# Patient Record
Sex: MALE | Race: WHITE | Employment: OTHER | URBAN - METROPOLITAN AREA
[De-identification: names, ages, dates, MRNs, and addresses within clinical notes are randomized per-mention and may not be internally consistent; named-entity substitution may affect disease eponyms.]

---

## 2017-03-04 ENCOUNTER — HOSPITAL ENCOUNTER (OUTPATIENT)
Age: 67
Discharge: HOME | End: 2017-03-04
Attending: FAMILY MEDICINE
Payer: COMMERCIAL

## 2017-03-04 VITALS
TEMPERATURE: 98 F | HEART RATE: 90 BPM | RESPIRATION RATE: 22 BRPM | DIASTOLIC BLOOD PRESSURE: 87 MMHG | OXYGEN SATURATION: 98 % | SYSTOLIC BLOOD PRESSURE: 144 MMHG

## 2017-03-04 DIAGNOSIS — J20.9 ACUTE BRONCHITIS, UNSPECIFIED ORGANISM: Primary | ICD-10-CM

## 2017-03-04 DIAGNOSIS — R06.2 WHEEZING: ICD-10-CM

## 2017-03-04 PROCEDURE — 94640 AIRWAY INHALATION TREATMENT: CPT

## 2017-03-04 PROCEDURE — 99214 OFFICE O/P EST MOD 30 MIN: CPT

## 2017-03-04 RX ORDER — CEFDINIR 300 MG/1
300 CAPSULE ORAL 2 TIMES DAILY
Qty: 20 CAPSULE | Refills: 0 | Status: SHIPPED | OUTPATIENT
Start: 2017-03-04 | End: 2017-03-14

## 2017-03-04 RX ORDER — IPRATROPIUM BROMIDE AND ALBUTEROL SULFATE 2.5; .5 MG/3ML; MG/3ML
3 SOLUTION RESPIRATORY (INHALATION) ONCE
Status: COMPLETED | OUTPATIENT
Start: 2017-03-04 | End: 2017-03-04

## 2017-03-04 RX ORDER — ALBUTEROL SULFATE 90 UG/1
2 AEROSOL, METERED RESPIRATORY (INHALATION) EVERY 4 HOURS PRN
Qty: 1 INHALER | Refills: 0 | Status: SHIPPED | OUTPATIENT
Start: 2017-03-04 | End: 2017-04-03

## 2017-03-04 NOTE — ED PROVIDER NOTES
Patient presents with:  Cough/URI      HPI:     Diane Davenport is a 74year old male who presents for evaluation of a chief complaint of fever, sore throat, cough which is  dry, chest tightness or congestion and wheezing. Location: Respiratory System.  Onse Soln   Sig: Inhale 2 puffs into the lungs every 4 (four) hours as needed for Wheezing. Dispense:  1 Inhaler   Refill:  0  cefdinir 300 MG Oral Cap   Sig: Take 1 capsule (300 mg total) by mouth 2 (two) times daily.    Dispense:  20 capsule   Refill:  0

## 2017-03-04 NOTE — ED INITIAL ASSESSMENT (HPI)
Pt presents to the immediate care due to flu like symptoms. Pt states he has had chills, decreased sleep, fever, sweats, sore throat, cough and congestion x 3 days. Reports 3 people in his family has had the flu in the last few weeks.

## 2019-11-04 ENCOUNTER — HOSPITAL ENCOUNTER (OUTPATIENT)
Age: 69
Discharge: HOME | End: 2019-11-04
Payer: OTHER GOVERNMENT

## 2019-11-04 VITALS
BODY MASS INDEX: 37.49 KG/M2 | WEIGHT: 225 LBS | RESPIRATION RATE: 20 BRPM | HEART RATE: 63 BPM | OXYGEN SATURATION: 97 % | DIASTOLIC BLOOD PRESSURE: 72 MMHG | HEIGHT: 65 IN | TEMPERATURE: 98 F | SYSTOLIC BLOOD PRESSURE: 137 MMHG
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
## 2019-11-04 DIAGNOSIS — S40.862A TICK BITE OF LEFT UPPER ARM, INITIAL ENCOUNTER: Primary | ICD-10-CM

## 2019-11-04 PROCEDURE — 81229 CYTOG ALYS CHRML ABNR SNPCGH: CPT

## 2019-11-04 PROCEDURE — 99213 OFFICE O/P EST LOW 20 MIN: CPT

## 2019-11-04 PROCEDURE — 99214 OFFICE O/P EST MOD 30 MIN: CPT

## 2019-11-04 PROCEDURE — 88300 SURGICAL PATH GROSS: CPT | Performed by: PHYSICIAN ASSISTANT

## 2019-11-04 RX ORDER — DOXYCYCLINE HYCLATE 100 MG/1
100 CAPSULE ORAL 2 TIMES DAILY
Qty: 14 CAPSULE | Refills: 0 | Status: SHIPPED | OUTPATIENT
Start: 2019-11-04 | End: 2019-11-18

## 2019-11-04 NOTE — ED PROVIDER NOTES
Patient Seen in: [facility]      History   Patient presents with:  Rash Skin Problem (integumentary)    Stated Complaint: tick bite    HPI    Susu Lugo is a 69-year-old male who comes in today complaining of a tick bite to the left Constitutional:       General: He is not in acute distress. Appearance: He is well-developed. He is not diaphoretic. HENT:      Head: Normocephalic and atraumatic. Neck:      Musculoskeletal: Normal range of motion.    Cardiovascular:      Rate an initial encounter  (primary encounter diagnosis)    Disposition:  Discharge  11/4/2019  8:22 am    Follow-up:  Debora Box MD   [address] [facility] [address]    Schedule an appointment as soon as possible for a visit       Pin

## 2019-11-04 NOTE — ED INITIAL ASSESSMENT (HPI)
Pt c/o tick bite to left upper arm. States wife, who is a nurse practitioner, removed tick this am.  Pt c/o hot/cold flashes, fatigue and joint pain. 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 Thinks tick may have been there x 1 week.
(3) adequate

## 2019-11-05 NOTE — ED NOTES
Called patient and told tick was a vector for lyme and to finish doxy and we will call with result from [facility]. Patient verbalized understanding.

## 2019-12-29 ENCOUNTER — APPOINTMENT (OUTPATIENT)
Dept: GENERAL RADIOLOGY | Age: 69
End: 2019-12-29
Attending: EMERGENCY MEDICINE
Payer: COMMERCIAL

## 2019-12-29 ENCOUNTER — HOSPITAL ENCOUNTER (OUTPATIENT)
Age: 69
Discharge: HOME | End: 2019-12-29
Attending: EMERGENCY MEDICINE
Payer: COMMERCIAL

## 2019-12-29 VITALS
DIASTOLIC BLOOD PRESSURE: 63 MMHG | BODY MASS INDEX: 38.41 KG/M2 | TEMPERATURE: 98 F | HEIGHT: 64 IN | OXYGEN SATURATION: 96 % | WEIGHT: 225 LBS | SYSTOLIC BLOOD PRESSURE: 123 MMHG | RESPIRATION RATE: 20 BRPM | HEART RATE: 78 BPM

## 2019-12-29 DIAGNOSIS — J20.9 BRONCHOSPASM WITH BRONCHITIS, ACUTE: Primary | ICD-10-CM

## 2019-12-29 LAB
ATRIAL RATE: 61 BPM
P-R INTERVAL: 214 MS
Q-T INTERVAL: 406 MS
QRS DURATION: 124 MS
QTC CALCULATION (BEZET): 408 MS
R AXIS: -57 DEGREES
T AXIS: 1 DEGREES
VENTRICULAR RATE: 61 BPM

## 2019-12-29 PROCEDURE — 93010 ELECTROCARDIOGRAM REPORT: CPT

## 2019-12-29 PROCEDURE — 99214 OFFICE O/P EST MOD 30 MIN: CPT

## 2019-12-29 PROCEDURE — 71046 X-RAY EXAM CHEST 2 VIEWS: CPT | Performed by: EMERGENCY MEDICINE

## 2019-12-29 PROCEDURE — 93005 ELECTROCARDIOGRAM TRACING: CPT

## 2019-12-29 RX ORDER — ALBUTEROL SULFATE 90 UG/1
1-2 AEROSOL, METERED RESPIRATORY (INHALATION) EVERY 4 HOURS PRN
Qty: 1 INHALER | Refills: 0 | Status: SHIPPED | OUTPATIENT
Start: 2019-12-29 | End: 2019-12-30

## 2019-12-29 RX ORDER — ALBUTEROL SULFATE 2.5 MG/3ML
2.5 SOLUTION RESPIRATORY (INHALATION) EVERY 4 HOURS PRN
Qty: 30 AMPULE | Refills: 1 | Status: SHIPPED | OUTPATIENT
Start: 2019-12-29

## 2019-12-29 RX ORDER — PREDNISONE 10 MG/1
TABLET ORAL
Qty: 30 TABLET | Refills: 0 | Status: SHIPPED | OUTPATIENT
Start: 2019-12-29

## 2019-12-29 NOTE — ED PROVIDER NOTES
Patient presents with:  Cough/URI      HPI:     Dima Elmore is a 68year old male who presents for cough and xcongestion for 10 days. + multiple sick contacts.    + felt fevrish, no documented temp  Tried OTC  Theraflu with minimal relief  + wheezing  + radiographs were obtained. PATIENT STATED HISTORY: (As transcribed by Technologist)  Cough, congestion [id number]. Fever. Non-smoker. FINDINGS:  LUNGS:  No focal consolidation. Normal vascularity. CARDIAC:  Normal size cardiac silhouette.  MEDIASTINUM:  N x-ray reviewed with patient no evidence for any pneumonia. Suspect underlying COPD possible COPD exacerbation. Will treat with steroids and albuterol nebulizations or inhalers  3. Start prednisone tapering dose  4.  Nebulize with albuterol every 4-6 hours

## 2019-12-29 NOTE — ED INITIAL ASSESSMENT (HPI)
C/O cough and congestion x10 days. Sts that he is not feeling any better. Report exertional dyspnea.

## 2019-12-30 RX ORDER — ALBUTEROL SULFATE 90 UG/1
AEROSOL, METERED RESPIRATORY (INHALATION)
Qty: 42.5 G | Refills: 0 | Status: SHIPPED | OUTPATIENT
Start: 2019-12-30

## 2021-03-12 DIAGNOSIS — Z23 NEED FOR VACCINATION: ICD-10-CM

## 2023-01-12 ENCOUNTER — TELEPHONE (OUTPATIENT)
Dept: CT IMAGING | Facility: HOSPITAL | Age: 73
End: 2023-01-12

## 2023-01-16 ENCOUNTER — TELEPHONE (OUTPATIENT)
Dept: CT IMAGING | Facility: HOSPITAL | Age: 73
End: 2023-01-16

## 2023-01-16 NOTE — TELEPHONE ENCOUNTER
Patient stated he wasn't able to have appointment for MRI with us as his cardiologist is from [facility]. He stated to cancel his apt. CS notified.

## 2023-05-22 ENCOUNTER — TELEPHONE (OUTPATIENT)
Dept: CT IMAGING | Facility: HOSPITAL | Age: 73
End: 2023-05-22

## 2023-05-22 NOTE — TELEPHONE ENCOUNTER
I reached out to patient to alert him that we had no signed orders from Cardiology at [facility]. He stated he is seen strictly through [facility]. He stated he spoke with someone over a week ago confirming all necessary information was given that was needed and that his scan was set. I explained that we do a final check and make sure we have signed orders from attending cardiologist confirming safety of device in MRI. Pt wife Eunice Roman got on call and stated pt is cared for through the [address] [place] [address] and the cardiologists don't provide signed orders for outside hospitals that don't manage MRI device patients with on-site device clinics. She stated he would look to other hospitals such as [facility] as she stated they will take this type of patient. I apologized for the confusion and told patient I would cancel his MRI apt for 5/25.

## 2023-05-23 ENCOUNTER — TELEPHONE (OUTPATIENT)
Dept: CT IMAGING | Facility: HOSPITAL | Age: 73
End: 2023-05-23

## 2023-05-23 NOTE — TELEPHONE ENCOUNTER
Received a call back following the message I left concerning pt and his MRI that has been cancelled due to lack of signed cardiology/device orders. I spoke with Chavez Cerrato RN. Explained our process in obtaining signed ordered and that currently [facility] does not provide these orders. He stated he would put message into ordering provider to follow up on pt needs.

## 2023-05-23 NOTE — TELEPHONE ENCOUNTER
[facility] to provide message to Dr Megan Foley alerting her that this patient is unable to have MRI at [facility] due to the fact that we cannot obtain signed clearance device orders from his cardiologist at [facility]. Callback number provided for any questions. I spoke with Jael contact at [facility].

## 2023-09-12 ENCOUNTER — HOSPITAL ENCOUNTER (INPATIENT)
Facility: HOSPITAL | Age: 73
LOS: 2 days | Discharge: HOME | DRG: 177 | End: 2023-09-14
Attending: EMERGENCY MEDICINE | Admitting: STUDENT IN AN ORGANIZED HEALTH CARE EDUCATION/TRAINING PROGRAM
Payer: COMMERCIAL

## 2023-09-12 ENCOUNTER — HOSPITAL ENCOUNTER (OUTPATIENT)
Age: 73
Discharge: ANOTHER HOSPITAL | End: 2023-09-12
Attending: EMERGENCY MEDICINE
Payer: COMMERCIAL

## 2023-09-12 ENCOUNTER — APPOINTMENT (OUTPATIENT)
Dept: GENERAL RADIOLOGY | Age: 73
End: 2023-09-12
Attending: EMERGENCY MEDICINE
Payer: COMMERCIAL

## 2023-09-12 ENCOUNTER — APPOINTMENT (OUTPATIENT)
Dept: GENERAL RADIOLOGY | Age: 73
DRG: 177 | End: 2023-09-12
Attending: EMERGENCY MEDICINE
Payer: COMMERCIAL

## 2023-09-12 ENCOUNTER — APPOINTMENT (OUTPATIENT)
Dept: CT IMAGING | Facility: HOSPITAL | Age: 73
DRG: 177 | End: 2023-09-12
Attending: EMERGENCY MEDICINE
Payer: COMMERCIAL

## 2023-09-12 ENCOUNTER — APPOINTMENT (OUTPATIENT)
Dept: CT IMAGING | Facility: HOSPITAL | Age: 73
End: 2023-09-12
Attending: EMERGENCY MEDICINE
Payer: COMMERCIAL

## 2023-09-12 ENCOUNTER — HOSPITAL ENCOUNTER (OUTPATIENT)
Age: 73
Discharge: ANOTHER HOSPITAL | DRG: 177 | End: 2023-09-12
Attending: EMERGENCY MEDICINE
Payer: COMMERCIAL

## 2023-09-12 ENCOUNTER — HOSPITAL ENCOUNTER (INPATIENT)
Facility: HOSPITAL | Age: 73
LOS: 2 days | Discharge: HOME | End: 2023-09-14
Attending: EMERGENCY MEDICINE | Admitting: STUDENT IN AN ORGANIZED HEALTH CARE EDUCATION/TRAINING PROGRAM
Payer: COMMERCIAL

## 2023-09-12 VITALS
HEART RATE: 112 BPM | OXYGEN SATURATION: 96 % | RESPIRATION RATE: 22 BRPM | SYSTOLIC BLOOD PRESSURE: 156 MMHG | TEMPERATURE: 98 F | DIASTOLIC BLOOD PRESSURE: 99 MMHG

## 2023-09-12 DIAGNOSIS — J12.82 PNEUMONIA DUE TO 2019 NOVEL CORONAVIRUS: ICD-10-CM

## 2023-09-12 DIAGNOSIS — U07.1 COVID-19: Primary | ICD-10-CM

## 2023-09-12 DIAGNOSIS — J44.1 COPD EXACERBATION (HCC): ICD-10-CM

## 2023-09-12 DIAGNOSIS — U07.1 PNEUMONIA DUE TO 2019 NOVEL CORONAVIRUS: ICD-10-CM

## 2023-09-12 DIAGNOSIS — J18.9 COMMUNITY ACQUIRED PNEUMONIA, UNSPECIFIED LATERALITY: ICD-10-CM

## 2023-09-12 LAB
#MXD IC: 0.7 X10ˆ3/UL (ref 0.1–1)
ALBUMIN SERPL-MCNC: 3.2 G/DL (ref 3.4–5)
ALBUMIN/GLOB SERPL: 0.8 {RATIO} (ref 1–2)
ALP LIVER SERPL-CCNC: 99 U/L
ALT SERPL-CCNC: 67 U/L
ANION GAP SERPL CALC-SCNC: 6 MMOL/L (ref 0–18)
AST SERPL-CCNC: 28 U/L (ref 15–37)
ATRIAL RATE: 110 BPM
BASOPHILS # BLD AUTO: 0.04 X10(3) UL (ref 0–0.2)
BASOPHILS NFR BLD AUTO: 0.4 %
BILIRUB SERPL-MCNC: 0.2 MG/DL (ref 0.1–2)
BUN BLD-MCNC: 18 MG/DL (ref 7–18)
BUN BLD-MCNC: 18 MG/DL (ref 7–18)
CALCIUM BLD-MCNC: 9.2 MG/DL (ref 8.5–10.1)
CHLORIDE BLD-SCNC: 106 MMOL/L (ref 98–112)
CHLORIDE SERPL-SCNC: 107 MMOL/L (ref 98–112)
CO2 BLD-SCNC: 24 MMOL/L (ref 21–32)
CO2 SERPL-SCNC: 25 MMOL/L (ref 21–32)
CREAT BLD-MCNC: 1.3 MG/DL
CREAT BLD-MCNC: 1.4 MG/DL
DDIMER WHOLE BLOOD: 2060 NG/ML DDU (ref ?–400)
EGFRCR SERPLBLD CKD-EPI 2021: 53 ML/MIN/1.73M2 (ref 60–?)
EGFRCR SERPLBLD CKD-EPI 2021: 58 ML/MIN/1.73M2 (ref 60–?)
EOSINOPHIL # BLD AUTO: 0.04 X10(3) UL (ref 0–0.7)
EOSINOPHIL NFR BLD AUTO: 0.4 %
ERYTHROCYTE [DISTWIDTH] IN BLOOD BY AUTOMATED COUNT: 12.7 %
GLOBULIN PLAS-MCNC: 4.2 G/DL (ref 2.8–4.4)
GLUCOSE BLD-MCNC: 134 MG/DL (ref 70–99)
GLUCOSE BLD-MCNC: 151 MG/DL (ref 70–99)
HCT VFR BLD AUTO: 40.8 %
HCT VFR BLD AUTO: 42.6 %
HCT VFR BLD CALC: 41 %
HGB BLD-MCNC: 13.5 G/DL
HGB BLD-MCNC: 13.7 G/DL
IMM GRANULOCYTES # BLD AUTO: 0.08 X10(3) UL (ref 0–1)
IMM GRANULOCYTES NFR BLD: 0.9 %
ISTAT IONIZED CALCIUM FOR CHEM 8: 1.13 MMOL/L (ref 1.12–1.32)
LYMPHOCYTES # BLD AUTO: 0.35 X10(3) UL (ref 1–4)
LYMPHOCYTES # BLD AUTO: 0.9 X10ˆ3/UL (ref 1–4)
LYMPHOCYTES NFR BLD AUTO: 3.8 %
LYMPHOCYTES NFR BLD AUTO: 9.7 %
MCH RBC QN AUTO: 28.5 PG (ref 26–34)
MCH RBC QN AUTO: 28.7 PG (ref 26–34)
MCHC RBC AUTO-ENTMCNC: 32.2 G/DL (ref 31–37)
MCHC RBC AUTO-ENTMCNC: 33.1 G/DL (ref 31–37)
MCV RBC AUTO: 86.6 FL
MCV RBC AUTO: 88.8 FL (ref 80–100)
MIXED CELL %: 7.8 %
MONOCYTES # BLD AUTO: 0.1 X10(3) UL (ref 0.1–1)
MONOCYTES NFR BLD AUTO: 1.1 %
NEUTROPHILS # BLD AUTO: 7.3 X10ˆ3/UL (ref 1.5–7.7)
NEUTROPHILS # BLD AUTO: 8.66 X10 (3) UL (ref 1.5–7.7)
NEUTROPHILS # BLD AUTO: 8.66 X10(3) UL (ref 1.5–7.7)
NEUTROPHILS NFR BLD AUTO: 82.5 %
NEUTROPHILS NFR BLD AUTO: 93.4 %
NT-PROBNP SERPL-MCNC: 242 PG/ML (ref ?–125)
OSMOLALITY SERPL CALC.SUM OF ELEC: 291 MOSM/KG (ref 275–295)
P-R INTERVAL: 158 MS
PLATELET # BLD AUTO: 516 10(3)UL (ref 150–450)
PLATELET # BLD AUTO: 676 X10ˆ3/UL (ref 150–450)
POTASSIUM BLD-SCNC: 3.8 MMOL/L (ref 3.6–5.1)
POTASSIUM SERPL-SCNC: 4.3 MMOL/L (ref 3.5–5.1)
PROT SERPL-MCNC: 7.4 G/DL (ref 6.4–8.2)
Q-T INTERVAL: 402 MS
QRS DURATION: 156 MS
QTC CALCULATION (BEZET): 544 MS
R AXIS: -65 DEGREES
RBC # BLD AUTO: 4.71 X10(6)UL
RBC # BLD AUTO: 4.8 X10ˆ6/UL
SARS-COV-2 RNA RESP QL NAA+PROBE: DETECTED
SODIUM BLD-SCNC: 139 MMOL/L (ref 136–145)
SODIUM SERPL-SCNC: 138 MMOL/L (ref 136–145)
T AXIS: 96 DEGREES
TROPONIN I BLD-MCNC: <0.02 NG/ML
VENTRICULAR RATE: 110 BPM
WBC # BLD AUTO: 8.9 X10ˆ3/UL (ref 4–11)
WBC # BLD AUTO: 9.3 X10(3) UL (ref 4–11)

## 2023-09-12 PROCEDURE — 85025 COMPLETE CBC W/AUTO DIFF WBC: CPT | Performed by: EMERGENCY MEDICINE

## 2023-09-12 PROCEDURE — 80047 BASIC METABLC PNL IONIZED CA: CPT

## 2023-09-12 PROCEDURE — 93010 ELECTROCARDIOGRAM REPORT: CPT

## 2023-09-12 PROCEDURE — 85378 FIBRIN DEGRADE SEMIQUANT: CPT | Performed by: EMERGENCY MEDICINE

## 2023-09-12 PROCEDURE — 93005 ELECTROCARDIOGRAM TRACING: CPT

## 2023-09-12 PROCEDURE — 99214 OFFICE O/P EST MOD 30 MIN: CPT

## 2023-09-12 PROCEDURE — 99223 1ST HOSP IP/OBS HIGH 75: CPT | Performed by: STUDENT IN AN ORGANIZED HEALTH CARE EDUCATION/TRAINING PROGRAM

## 2023-09-12 PROCEDURE — 71046 X-RAY EXAM CHEST 2 VIEWS: CPT | Performed by: EMERGENCY MEDICINE

## 2023-09-12 PROCEDURE — 94640 AIRWAY INHALATION TREATMENT: CPT

## 2023-09-12 PROCEDURE — 71275 CT ANGIOGRAPHY CHEST: CPT | Performed by: EMERGENCY MEDICINE

## 2023-09-12 PROCEDURE — 84484 ASSAY OF TROPONIN QUANT: CPT

## 2023-09-12 RX ORDER — GABAPENTIN 300 MG/1
300 CAPSULE ORAL NIGHTLY
COMMUNITY

## 2023-09-12 RX ORDER — ACETAMINOPHEN 500 MG
500 TABLET ORAL EVERY 4 HOURS PRN
Status: DISCONTINUED | OUTPATIENT
Start: 2023-09-12 | End: 2023-09-14

## 2023-09-12 RX ORDER — AZITHROMYCIN 250 MG/1
500 TABLET, FILM COATED ORAL
Status: DISCONTINUED | OUTPATIENT
Start: 2023-09-13 | End: 2023-09-14

## 2023-09-12 RX ORDER — CLOPIDOGREL BISULFATE 75 MG/1
75 TABLET ORAL DAILY
COMMUNITY

## 2023-09-12 RX ORDER — ENEMA 19; 7 G/133ML; G/133ML
1 ENEMA RECTAL ONCE AS NEEDED
Status: DISCONTINUED | OUTPATIENT
Start: 2023-09-12 | End: 2023-09-14

## 2023-09-12 RX ORDER — ENOXAPARIN SODIUM 100 MG/ML
40 INJECTION SUBCUTANEOUS DAILY
Status: DISCONTINUED | OUTPATIENT
Start: 2023-09-13 | End: 2023-09-14

## 2023-09-12 RX ORDER — IPRATROPIUM BROMIDE AND ALBUTEROL SULFATE 2.5; .5 MG/3ML; MG/3ML
3 SOLUTION RESPIRATORY (INHALATION) ONCE
Status: DISCONTINUED | OUTPATIENT
Start: 2023-09-12 | End: 2023-09-12

## 2023-09-12 RX ORDER — BENZONATATE 200 MG/1
200 CAPSULE ORAL 3 TIMES DAILY PRN
Status: DISCONTINUED | OUTPATIENT
Start: 2023-09-12 | End: 2023-09-14

## 2023-09-12 RX ORDER — FLUTICASONE PROPIONATE 50 MCG
1 SPRAY, SUSPENSION (ML) NASAL 2 TIMES DAILY
Status: DISCONTINUED | OUTPATIENT
Start: 2023-09-12 | End: 2023-09-14

## 2023-09-12 RX ORDER — ALBUTEROL SULFATE 2.5 MG/3ML
2.5 SOLUTION RESPIRATORY (INHALATION) EVERY 4 HOURS PRN
Status: DISCONTINUED | OUTPATIENT
Start: 2023-09-12 | End: 2023-09-14

## 2023-09-12 RX ORDER — LISINOPRIL AND HYDROCHLOROTHIAZIDE 25; 20 MG/1; MG/1
1 TABLET ORAL DAILY
Status: DISCONTINUED | OUTPATIENT
Start: 2023-09-12 | End: 2023-09-12 | Stop reason: SDUPTHER

## 2023-09-12 RX ORDER — GUAIFENESIN 600 MG/1
600 TABLET, EXTENDED RELEASE ORAL 2 TIMES DAILY
Status: DISCONTINUED | OUTPATIENT
Start: 2023-09-12 | End: 2023-09-14

## 2023-09-12 RX ORDER — ALBUTEROL SULFATE 90 UG/1
4 AEROSOL, METERED RESPIRATORY (INHALATION) EVERY 4 HOURS PRN
Status: DISCONTINUED | OUTPATIENT
Start: 2023-09-12 | End: 2023-09-14

## 2023-09-12 RX ORDER — POLYETHYLENE GLYCOL 3350 17 G/17G
17 POWDER, FOR SOLUTION ORAL DAILY PRN
Status: DISCONTINUED | OUTPATIENT
Start: 2023-09-12 | End: 2023-09-14

## 2023-09-12 RX ORDER — BUPROPION HYDROCHLORIDE 150 MG/1
150 TABLET, EXTENDED RELEASE ORAL 2 TIMES DAILY
Status: DISCONTINUED | OUTPATIENT
Start: 2023-09-12 | End: 2023-09-14

## 2023-09-12 RX ORDER — ASPIRIN 81 MG/1
1 TABLET ORAL 2 TIMES DAILY
COMMUNITY

## 2023-09-12 RX ORDER — ATORVASTATIN CALCIUM 80 MG/1
80 TABLET, FILM COATED ORAL
COMMUNITY

## 2023-09-12 RX ORDER — SENNOSIDES 8.6 MG
17.2 TABLET ORAL NIGHTLY PRN
Status: DISCONTINUED | OUTPATIENT
Start: 2023-09-12 | End: 2023-09-14

## 2023-09-12 RX ORDER — ALBUTEROL SULFATE 90 UG/1
8 AEROSOL, METERED RESPIRATORY (INHALATION) ONCE
Status: COMPLETED | OUTPATIENT
Start: 2023-09-12 | End: 2023-09-12

## 2023-09-12 RX ORDER — MELATONIN
3 NIGHTLY PRN
Status: DISCONTINUED | OUTPATIENT
Start: 2023-09-12 | End: 2023-09-14

## 2023-09-12 RX ORDER — HYDROCODONE BITARTRATE AND ACETAMINOPHEN 5; 325 MG/1; MG/1
1 TABLET ORAL EVERY 6 HOURS PRN
COMMUNITY

## 2023-09-12 RX ORDER — AZITHROMYCIN 250 MG/1
500 TABLET, FILM COATED ORAL ONCE
Status: COMPLETED | OUTPATIENT
Start: 2023-09-12 | End: 2023-09-12

## 2023-09-12 RX ORDER — ECHINACEA PURPUREA EXTRACT 125 MG
1 TABLET ORAL
Status: DISCONTINUED | OUTPATIENT
Start: 2023-09-12 | End: 2023-09-14

## 2023-09-12 RX ORDER — GABAPENTIN 300 MG/1
300 CAPSULE ORAL NIGHTLY
Status: DISCONTINUED | OUTPATIENT
Start: 2023-09-12 | End: 2023-09-14

## 2023-09-12 RX ORDER — BISACODYL 10 MG
10 SUPPOSITORY, RECTAL RECTAL
Status: DISCONTINUED | OUTPATIENT
Start: 2023-09-12 | End: 2023-09-14

## 2023-09-12 RX ORDER — HYDROCODONE BITARTRATE AND ACETAMINOPHEN 7.5; 3 MG/1; MG/1
7.5 TABLET ORAL EVERY 4 HOURS PRN
Status: ON HOLD | COMMUNITY
End: 2023-09-12

## 2023-09-12 RX ORDER — HYDROCODONE BITARTRATE AND ACETAMINOPHEN 10; 325 MG/1; MG/1
1 TABLET ORAL EVERY 6 HOURS PRN
Status: DISCONTINUED | OUTPATIENT
Start: 2023-09-12 | End: 2023-09-14

## 2023-09-12 RX ORDER — ASPIRIN 81 MG/1
81 TABLET ORAL 2 TIMES DAILY
Status: DISCONTINUED | OUTPATIENT
Start: 2023-09-12 | End: 2023-09-14

## 2023-09-12 RX ORDER — CLOPIDOGREL BISULFATE 75 MG/1
75 TABLET ORAL DAILY
Status: DISCONTINUED | OUTPATIENT
Start: 2023-09-13 | End: 2023-09-14

## 2023-09-12 RX ORDER — METHYLPREDNISOLONE SODIUM SUCCINATE 125 MG/2ML
125 INJECTION, POWDER, LYOPHILIZED, FOR SOLUTION INTRAMUSCULAR; INTRAVENOUS ONCE
Status: DISCONTINUED | OUTPATIENT
Start: 2023-09-12 | End: 2023-09-12

## 2023-09-12 RX ORDER — NIRMATRELVIR AND RITONAVIR 150-100 MG
2 KIT ORAL 2 TIMES DAILY
Status: ON HOLD | COMMUNITY
Start: 2023-08-28 | End: 2023-09-13

## 2023-09-12 RX ORDER — ATORVASTATIN CALCIUM 80 MG/1
80 TABLET, FILM COATED ORAL NIGHTLY
Status: DISCONTINUED | OUTPATIENT
Start: 2023-09-12 | End: 2023-09-14

## 2023-09-12 RX ORDER — METOCLOPRAMIDE HYDROCHLORIDE 5 MG/ML
5 INJECTION INTRAMUSCULAR; INTRAVENOUS EVERY 8 HOURS PRN
Status: DISCONTINUED | OUTPATIENT
Start: 2023-09-12 | End: 2023-09-14

## 2023-09-12 RX ORDER — METHYLPREDNISOLONE SODIUM SUCCINATE 125 MG/2ML
125 INJECTION, POWDER, LYOPHILIZED, FOR SOLUTION INTRAMUSCULAR; INTRAVENOUS ONCE
Status: COMPLETED | OUTPATIENT
Start: 2023-09-12 | End: 2023-09-12

## 2023-09-12 RX ORDER — ONDANSETRON 2 MG/ML
4 INJECTION INTRAMUSCULAR; INTRAVENOUS EVERY 6 HOURS PRN
Status: DISCONTINUED | OUTPATIENT
Start: 2023-09-12 | End: 2023-09-14

## 2023-09-12 NOTE — ED INITIAL ASSESSMENT (HPI)
C/o SOB for 2 weeks. Pt reports dyspnea during exertion and dyspnea during rest, weakness. Pt reports his wife had Covid 3 weeks ago. Pt reports use of rescue inhaler. Diaphoresis noted. Pt reports nasal congestion and chest congestion.

## 2023-09-12 NOTE — ED QUICK NOTES
Pt declines EMS. Pt understands to go directly to er. Pt understand to remain NPO. Pt instructed if symptoms gets worse pull over and call 911. Pt to er via private car with son. AMA form completed. Pt to car via wheelchair.

## 2023-09-12 NOTE — ED INITIAL ASSESSMENT (HPI)
Patient sent to ER from [facility] to rule out a PE. Had blood work, CXR and EKG done @ IC. Covid + , CXR showed pneumonia.

## 2023-09-13 ENCOUNTER — APPOINTMENT (OUTPATIENT)
Dept: CV DIAGNOSTICS | Facility: HOSPITAL | Age: 73
End: 2023-09-13
Attending: STUDENT IN AN ORGANIZED HEALTH CARE EDUCATION/TRAINING PROGRAM
Payer: COMMERCIAL

## 2023-09-13 ENCOUNTER — APPOINTMENT (OUTPATIENT)
Dept: ULTRASOUND IMAGING | Facility: HOSPITAL | Age: 73
End: 2023-09-13
Attending: STUDENT IN AN ORGANIZED HEALTH CARE EDUCATION/TRAINING PROGRAM
Payer: COMMERCIAL

## 2023-09-13 ENCOUNTER — APPOINTMENT (OUTPATIENT)
Dept: ULTRASOUND IMAGING | Facility: HOSPITAL | Age: 73
DRG: 177 | End: 2023-09-13
Attending: STUDENT IN AN ORGANIZED HEALTH CARE EDUCATION/TRAINING PROGRAM
Payer: COMMERCIAL

## 2023-09-13 ENCOUNTER — APPOINTMENT (OUTPATIENT)
Dept: CV DIAGNOSTICS | Facility: HOSPITAL | Age: 73
DRG: 177 | End: 2023-09-13
Attending: STUDENT IN AN ORGANIZED HEALTH CARE EDUCATION/TRAINING PROGRAM
Payer: COMMERCIAL

## 2023-09-13 LAB
ALBUMIN SERPL-MCNC: 2.9 G/DL (ref 3.4–5)
ALBUMIN/GLOB SERPL: 0.8 {RATIO} (ref 1–2)
ALP LIVER SERPL-CCNC: 85 U/L
ALT SERPL-CCNC: 56 U/L
ANION GAP SERPL CALC-SCNC: 5 MMOL/L (ref 0–18)
AST SERPL-CCNC: 16 U/L (ref 15–37)
ATRIAL RATE: 96 BPM
BASOPHILS # BLD AUTO: 0.02 X10(3) UL (ref 0–0.2)
BASOPHILS NFR BLD AUTO: 0.2 %
BILIRUB SERPL-MCNC: 0.2 MG/DL (ref 0.1–2)
BUN BLD-MCNC: 21 MG/DL (ref 7–18)
CALCIUM BLD-MCNC: 8.6 MG/DL (ref 8.5–10.1)
CHLORIDE SERPL-SCNC: 106 MMOL/L (ref 98–112)
CO2 SERPL-SCNC: 26 MMOL/L (ref 21–32)
CREAT BLD-MCNC: 1.18 MG/DL
CRP SERPL-MCNC: 1.93 MG/DL (ref ?–0.3)
DEPRECATED HBV CORE AB SER IA-ACNC: 225.4 NG/ML
EGFRCR SERPLBLD CKD-EPI 2021: 65 ML/MIN/1.73M2 (ref 60–?)
EOSINOPHIL # BLD AUTO: 0 X10(3) UL (ref 0–0.7)
EOSINOPHIL NFR BLD AUTO: 0 %
ERYTHROCYTE [DISTWIDTH] IN BLOOD BY AUTOMATED COUNT: 12.9 %
GLOBULIN PLAS-MCNC: 3.7 G/DL (ref 2.8–4.4)
GLUCOSE BLD-MCNC: 120 MG/DL (ref 70–99)
HCT VFR BLD AUTO: 38 %
HGB BLD-MCNC: 12.6 G/DL
IMM GRANULOCYTES # BLD AUTO: 0.08 X10(3) UL (ref 0–1)
IMM GRANULOCYTES NFR BLD: 0.7 %
LYMPHOCYTES # BLD AUTO: 0.64 X10(3) UL (ref 1–4)
LYMPHOCYTES NFR BLD AUTO: 6 %
MCH RBC QN AUTO: 29 PG (ref 26–34)
MCHC RBC AUTO-ENTMCNC: 33.2 G/DL (ref 31–37)
MCV RBC AUTO: 87.4 FL
MONOCYTES # BLD AUTO: 0.46 X10(3) UL (ref 0.1–1)
MONOCYTES NFR BLD AUTO: 4.3 %
NEUTROPHILS # BLD AUTO: 9.52 X10 (3) UL (ref 1.5–7.7)
NEUTROPHILS # BLD AUTO: 9.52 X10(3) UL (ref 1.5–7.7)
NEUTROPHILS NFR BLD AUTO: 88.8 %
OSMOLALITY SERPL CALC.SUM OF ELEC: 288 MOSM/KG (ref 275–295)
P-R INTERVAL: 168 MS
PLATELET # BLD AUTO: 579 10(3)UL (ref 150–450)
POTASSIUM SERPL-SCNC: 4.5 MMOL/L (ref 3.5–5.1)
PROCALCITONIN SERPL-MCNC: <0.05 NG/ML (ref ?–0.16)
PROT SERPL-MCNC: 6.6 G/DL (ref 6.4–8.2)
Q-T INTERVAL: 390 MS
QRS DURATION: 158 MS
QTC CALCULATION (BEZET): 492 MS
R AXIS: -63 DEGREES
RBC # BLD AUTO: 4.35 X10(6)UL
SODIUM SERPL-SCNC: 137 MMOL/L (ref 136–145)
T AXIS: 102 DEGREES
TROPONIN I HIGH SENSITIVITY: 34 NG/L
VENTRICULAR RATE: 96 BPM
WBC # BLD AUTO: 10.7 X10(3) UL (ref 4–11)

## 2023-09-13 PROCEDURE — 93306 TTE W/DOPPLER COMPLETE: CPT | Performed by: STUDENT IN AN ORGANIZED HEALTH CARE EDUCATION/TRAINING PROGRAM

## 2023-09-13 PROCEDURE — 99233 SBSQ HOSP IP/OBS HIGH 50: CPT | Performed by: HOSPITALIST

## 2023-09-13 PROCEDURE — 93971 EXTREMITY STUDY: CPT | Performed by: STUDENT IN AN ORGANIZED HEALTH CARE EDUCATION/TRAINING PROGRAM

## 2023-09-13 PROCEDURE — 99497 ADVNCD CARE PLAN 30 MIN: CPT | Performed by: HOSPITALIST

## 2023-09-13 NOTE — ED QUICK NOTES
Took over care of pt from [facility]. Pt resting on cart, respirations even/NL. Pt reports being sick for 12-14 days with increased WILLIAMS today, went to IC and was sent to ED, covid +. Pt room air O2 saturations 95-98%. Denies needs at this time. Call light within reach.  Provided tissues per request.

## 2023-09-13 NOTE — ED QUICK NOTES
Orders for admission, patient is aware of plan and ready to go upstairs. Any questions, please call ED RN Deneen Boas at extension [phone].      Patient Covid vaccination status: Unvaccinated     COVID Test Ordered in ED: None    COVID Suspicion at Admission: covid +    Running Infusions:  None    Mental Status/LOC at time of transport: A&Ox4    Other pertinent information:   CIWA score: N/A   NIH score:  N/A

## 2023-09-13 NOTE — PLAN OF CARE
Problem: Diabetes/Glucose Control  Goal: Glucose maintained within prescribed range  Description: INTERVENTIONS:  - Monitor Blood Glucose as ordered  - Assess for signs and symptoms of hyperglycemia and hypoglycemia  - Administer ordered medications to maintain glucose within target range  - Assess barriers to adequate nutritional intake and initiate nutrition consult as needed  - Instruct patient on self management of diabetes  Outcome: Progressing     Problem: Patient/Family Goals  Goal: Patient/Family Long Term Goal  Description: Patient's Long Term Goal: discharge    Interventions:  - follow and update POC  - See additional Care Plan goals for specific interventions  Outcome: Progressing  Goal: Patient/Family Short Term Goal  Description: Patient's Short Term Goal:   9/12 NOC: sleep    Interventions:   - cluster care  - See additional Care Plan goals for specific interventions  Outcome: Progressing

## 2023-09-13 NOTE — PROGRESS NOTES
Patient is A/Ox4. VSS. Afebrile. Patient c/o chronic back pain. See MAR. Patient is on RA. . Occasional Cough with scant sputum/ SOB with exertion. on Tele V-paced. Lovenox. Electrolyte protocol-non cardiac. Regular Diet. Denies any N/V/Diarrhea. IV ABX. Up with cane. Continent. Patient  updated on plan of care.

## 2023-09-13 NOTE — PROGRESS NOTES
NURSING ADMISSION NOTE      Patient admitted via Cart  Oriented to room. Safety precautions initiated. Bed in low position. Call light in reach. Admitted to room 519    Pt arrived alert and oriented x4, on RA. Admission navigator, med rec, and flowsheets complete. Educated pt on POC, verbalizes understanding, no further questions. MD at bedside.

## 2023-09-13 NOTE — CM/SW NOTE
Spoke with pt to address PHQ4 order. Pt has been diagnosed with PTSD/anxiety and takes medication for these conditions. He is a [employer]. He sees a psychiatrist through the [place] which is where he gets his ych meds. He says he does not need any other resources at this time.

## 2023-09-14 VITALS
OXYGEN SATURATION: 98 % | TEMPERATURE: 98 F | BODY MASS INDEX: 37.56 KG/M2 | SYSTOLIC BLOOD PRESSURE: 122 MMHG | HEIGHT: 64 IN | DIASTOLIC BLOOD PRESSURE: 71 MMHG | HEART RATE: 81 BPM | WEIGHT: 220 LBS | RESPIRATION RATE: 21 BRPM

## 2023-09-14 LAB
ALBUMIN SERPL-MCNC: 2.8 G/DL (ref 3.4–5)
ALBUMIN/GLOB SERPL: 0.8 {RATIO} (ref 1–2)
ALP LIVER SERPL-CCNC: 89 U/L
ALT SERPL-CCNC: 71 U/L
ANION GAP SERPL CALC-SCNC: 5 MMOL/L (ref 0–18)
AST SERPL-CCNC: 21 U/L (ref 15–37)
BASOPHILS # BLD AUTO: 0.08 X10(3) UL (ref 0–0.2)
BASOPHILS NFR BLD AUTO: 1 %
BILIRUB SERPL-MCNC: 0.3 MG/DL (ref 0.1–2)
BUN BLD-MCNC: 25 MG/DL (ref 7–18)
CALCIUM BLD-MCNC: 8.8 MG/DL (ref 8.5–10.1)
CHLORIDE SERPL-SCNC: 104 MMOL/L (ref 98–112)
CO2 SERPL-SCNC: 29 MMOL/L (ref 21–32)
CREAT BLD-MCNC: 1.22 MG/DL
EGFRCR SERPLBLD CKD-EPI 2021: 62 ML/MIN/1.73M2 (ref 60–?)
EOSINOPHIL # BLD AUTO: 0.37 X10(3) UL (ref 0–0.7)
EOSINOPHIL NFR BLD AUTO: 4.4 %
ERYTHROCYTE [DISTWIDTH] IN BLOOD BY AUTOMATED COUNT: 12.9 %
GLOBULIN PLAS-MCNC: 3.7 G/DL (ref 2.8–4.4)
GLUCOSE BLD-MCNC: 93 MG/DL (ref 70–99)
HCT VFR BLD AUTO: 37.9 %
HGB BLD-MCNC: 12.5 G/DL
IMM GRANULOCYTES # BLD AUTO: 0.15 X10(3) UL (ref 0–1)
IMM GRANULOCYTES NFR BLD: 1.8 %
LYMPHOCYTES # BLD AUTO: 1.08 X10(3) UL (ref 1–4)
LYMPHOCYTES NFR BLD AUTO: 12.8 %
MAGNESIUM SERPL-MCNC: 2.1 MG/DL (ref 1.6–2.6)
MCH RBC QN AUTO: 29 PG (ref 26–34)
MCHC RBC AUTO-ENTMCNC: 33 G/DL (ref 31–37)
MCV RBC AUTO: 87.9 FL
MONOCYTES # BLD AUTO: 0.68 X10(3) UL (ref 0.1–1)
MONOCYTES NFR BLD AUTO: 8.1 %
NEUTROPHILS # BLD AUTO: 6.05 X10 (3) UL (ref 1.5–7.7)
NEUTROPHILS # BLD AUTO: 6.05 X10(3) UL (ref 1.5–7.7)
NEUTROPHILS NFR BLD AUTO: 71.9 %
OSMOLALITY SERPL CALC.SUM OF ELEC: 290 MOSM/KG (ref 275–295)
PLATELET # BLD AUTO: 491 10(3)UL (ref 150–450)
POTASSIUM SERPL-SCNC: 4.4 MMOL/L (ref 3.5–5.1)
PROT SERPL-MCNC: 6.5 G/DL (ref 6.4–8.2)
RBC # BLD AUTO: 4.31 X10(6)UL
SODIUM SERPL-SCNC: 138 MMOL/L (ref 136–145)
WBC # BLD AUTO: 8.4 X10(3) UL (ref 4–11)

## 2023-09-14 PROCEDURE — 99232 SBSQ HOSP IP/OBS MODERATE 35: CPT | Performed by: HOSPITALIST

## 2023-09-14 RX ORDER — CEFUROXIME AXETIL 500 MG/1
500 TABLET ORAL 2 TIMES DAILY
Qty: 14 TABLET | Refills: 0 | Status: SHIPPED | OUTPATIENT
Start: 2023-09-14

## 2023-09-14 RX ORDER — ALBUTEROL SULFATE 2.5 MG/3ML
2.5 SOLUTION RESPIRATORY (INHALATION) EVERY 4 HOURS PRN
Qty: 30 EACH | Refills: 0 | Status: SHIPPED | OUTPATIENT
Start: 2023-09-14

## 2023-09-14 RX ORDER — LEVOFLOXACIN 750 MG/1
750 TABLET, FILM COATED ORAL EVERY OTHER DAY
Qty: 3 TABLET | Refills: 0 | Status: SHIPPED | OUTPATIENT
Start: 2023-09-14 | End: 2023-09-20

## 2023-09-14 NOTE — PROGRESS NOTES
TTE unremarkable yesterday      Card will sign off  Call w/ further questions  Cont followup post DC with usual Card at [facility]

## 2023-09-14 NOTE — PHYSICAL THERAPY NOTE
PHYSICAL THERAPY EVALUATION - INPATIENT     Room Number: 928/896-S  Evaluation Date: 2023  Type of Evaluation: Initial  Physician Order: PT Eval and Treat    Presenting Problem: +COVID19     Reason for Therapy: Mobility Dysfunction and Discharge Planning      ASSESSMENT   Pt is a 74year old male admitted on 2023 for +COVID10. Functional outcome measures completed include AMPA. The AM-PAC '6-Clicks' Inpatient Basic Mobility Short Form was completed and this patient is demonstrating a Approx Degree of Impairment: 0%  degree of impairment in mobility. Research supports that patients with this level of impairment may benefit from return to home. PT Discharge Recommendations: Home      PLAN  Patient has been evaluated and presents with no skilled Physical Therapy needs at this time. Patient discharged from Physical Therapy services. Please re-order if a new functional limitation presents during this admission. GOALS  Patient was able to achieve the following goals . .. Patient was able to transfer At previous, functional level   Patient able to ambulate on level surfaces At previous, functional level         HOME SITUATION  Type of Home: House   Home Layout: Two level                      Patient Owned Equipment: Cane       Prior Level of Young: Pt reports ambulating with cane at baseline mod ind. SUBJECTIVE  \"I couldn't even hold my head up the other day. '      OBJECTIVE     Fall Risk: Standard fall risk    WEIGHT BEARING RESTRICTION                   PAIN ASSESSMENT  Ratin          COGNITION  Overall Cognitive Status:  WFL - within functional limits    RANGE OF MOTION AND STRENGTH ASSESSMENT  Upper extremity ROM and strength are within functional limits     Lower extremity ROM is within functional limits     Lower extremity strength is within functional limits       BALANCE  Static Sitting: Good  Dynamic Sitting: Good  Static Standing: Good  Dynamic Standing: Fair -    ADDITIONAL TESTS                                    ACTIVITY TOLERANCE                         O2 WALK       NEUROLOGICAL FINDINGS                        AM-PAC '6-Clicks' INPATIENT SHORT FORM - BASIC MOBILITY  How much difficulty does the patient currently have. .. Patient Difficulty: Turning over in bed (including adjusting bedclothes, sheets and blankets)?: None   Patient Difficulty: Sitting down on and standing up from a chair with arms (e.g., wheelchair, bedside commode, etc.): None   Patient Difficulty: Moving from lying on back to sitting on the side of the bed?: None   How much help from another person does the patient currently need. .. Help from Another: Moving to and from a bed to a chair (including a wheelchair)?: None   Help from Another: Need to walk in hospital room?: None   Help from Another: Climbing 3-5 steps with a railing?: None       AM-PAC Score:  Raw Score: 24   Approx Degree of Impairment: 0%   Standardized Score (AM-PAC Scale): 61.14   CMS Modifier (G-Code): CH    FUNCTIONAL ABILITY STATUS  Gait Assessment   Functional Mobility/Gait Assessment  Gait Assistance: Modified independent  Distance (ft): 30  Assistive Device: Cane  Pattern: Within Functional Limits    Skilled Therapy Provided     Bed Mobility:  Rolling: ind  Supine to sit: ind   Sit to supine: NT     Transfer Mobility:  Sit to stand: ind   Stand to sit: ind  Gait = mod ind with cane as above. Gait restricted to within room only 2/2 COVID precautions    Therapist's comments:Encouraged regular OOB activity. Pt in agreement with. Exercise/Education Provided:  Bed mobility  Body mechanics  Functional activity tolerated  Gait training  Posture  Transfer training    Patient End of Session: Up in chair;Needs met;Call light within reach;RN aware of session/findings; All patient questions and concerns addressed    Patient Evaluation Complexity Level:  History Low - no personal factors and/or co-morbidities   Examination of body systems Low - addressing 1-2 elements   Clinical Presentation Low - Stable   Clinical Decision Making Low Complexity       PT Session Time: 8 minutes

## 2023-09-14 NOTE — PLAN OF CARE
Pt is aox4, VSS, afebrile. On RA. Tele NSR. SOB with exertion and at rest. No c/o n/v/d. C/o some back pain. Coughs with deep inspiration. Regular diet tolerating well. Up self to bathroom, pt refusing to have bed alarm on. IV saline locked, sub Q Lovenox. Pt is resting in bed with call light in place. Will continue to monitor.        Problem: Diabetes/Glucose Control  Goal: Glucose maintained within prescribed range  Description: INTERVENTIONS:  - Monitor Blood Glucose as ordered  - Assess for signs and symptoms of hyperglycemia and hypoglycemia  - Administer ordered medications to maintain glucose within target range  - Assess barriers to adequate nutritional intake and initiate nutrition consult as needed  - Instruct patient on self management of diabetes  Outcome: Progressing

## 2023-09-14 NOTE — PROGRESS NOTES
Pt refusing bed alarm educated on fall risk. Pt adamant on leaving bed alarm off. Verbalized understanding on fall risk and to use call light.

## 2023-09-14 NOTE — PROGRESS NOTES
Spoke to Agustina Booker charge RN  2/8 who stated she will try to interrogate pacemaker  if not dayshift charge RN will come to interrogate.

## 2023-09-15 ENCOUNTER — PATIENT OUTREACH (OUTPATIENT)
Dept: CASE MANAGEMENT | Age: 73
End: 2023-09-15

## 2023-09-15 DIAGNOSIS — Z02.9 ENCOUNTERS FOR UNSPECIFIED ADMINISTRATIVE PURPOSE: Primary | ICD-10-CM

## 2023-09-15 DIAGNOSIS — U07.1 COVID-19: ICD-10-CM

## 2023-09-15 PROCEDURE — 1111F DSCHRG MED/CURRENT MED MERGE: CPT

## 2023-09-18 NOTE — DISCHARGE SUMMARY
[facility] HOSPITALIST  DISCHARGE SUMMARY     Alejandrina Santo Patient Status:  Inpatient    1950 MRN [id number]   [facility] 5NW-A Attending No att. providers found   Hosp Day # 2 PCP Daphne Slaughter MD     Date of Admission: 2023  Date of Discharge:  2023     Discharge Disposition: Home or Self Care    Discharge Diagnosis:  #Innumerable areas of nodular airspace opacities with possible cavitation  #COVID 23  #Atypical chest pain  #CAD s/p PCI  #Essential hypertension  #Right lower extremity edema  #S/p pacemaker  #CKD 3  #Obesity  #Parkinson's disease       History of Present Illness: Alejandrina Santo is a 74year old male with  h/o COPD, DM HTN, HLD, CAD, pacemaker. The patient reports being diagnosed with COVID 2 weeks ago, took 4/5 days paxlovid ( lost pills for the last day )- he however continues to have headaches, has chills and gets diaphoretic. Has not been measuring his temp however. Pt reports he has been very short of breath with minimal exertion- cannot complete one flight of stairs without feeling SOB- also noted intermittent episodes of chest pain over past week which prompted him to seek medical attention. Brief Synopsis: pt admitted for sob and dx w/ pna. Pt initially had covid x2 weeks prior to presentation to the hospital, pt diagnosed w/ superimposed bacterial pna. Pt seen by pulm for abnormal CT chest findings. Pt will fu pulm as outpatient for innumerable nodules. Pt may require bronch in future. At this point pt medically stable for discharge. Pt also seen by ID who agrees w/ this plan. Lace+ Score: 43  59-90 High Risk  29-58 Medium Risk  0-28   Low Risk  Patient was referred to the [facility]. TCM Follow-Up Recommendation:  LACE 29-58:  Moderate Risk of readmission after discharge from the hospital.      Procedures during hospitalization:       Incidental or significant findings and recommendations (brief descriptions):      Lab/Test results pending at Discharge:       Consultants:  Pulmonary, ID, Cardiology    Discharge Medication List:     Discharge Medications        START taking these medications        Instructions Prescription details   cefuroxime 500 MG Tabs  Commonly known as: Ceftin      Take 1 tablet (500 mg total) by mouth 2 (two) times daily. Quantity: 14 tablet  Refills: 0     levoFLOXacin 750 MG Tabs  Commonly known as: Levaquin      Take 1 tablet (750 mg total) by mouth every other day for 6 days. Stop taking on: September 20, 2023  Quantity: 3 tablet  Refills: 0            CHANGE how you take these medications        Instructions Prescription details   albuterol (2.5 MG/3ML) 0.083% Nebu  Commonly known as: Ventolin  What changed: Another medication with the same name was added. Make sure you understand how and when to take each. Take 3 mL (2.5 mg total) by nebulization every 4 (four) hours as needed for Wheezing (cough). Quantity: 30 ampule  Refills: 1     albuterol 108 (90 Base) MCG/ACT Aers  Commonly known as: Ventolin HFA  What changed: Another medication with the same name was added. Make sure you understand how and when to take each. INHALE 1 TO 2 PUFFS INTO THE LUNGS EVERY 4 HOURS AS NEEDED FOR WHEEZING   Quantity: 42.5 g  Refills: 0     albuterol (2.5 MG/3ML) 0.083% Nebu  Commonly known as: Ventolin  What changed: You were already taking a medication with the same name, and this prescription was added. Make sure you understand how and when to take each. Take 3 mL (2.5 mg total) by nebulization every 4 (four) hours as needed for Wheezing. Quantity: 30 each  Refills: 0            CONTINUE taking these medications        Instructions Prescription details   aspirin 81 MG Tbec      Take 1 tablet (81 mg total) by mouth 2 (two) times daily. Refills: 0     atorvastatin 80 MG Tabs  Commonly known as: Lipitor      1 tablet (80 mg total).    Refills: 0     buPROPion  MG Tb12  Commonly known as: WELLBUTRIN SR      Take 1 tablet (150 mg total) by mouth 2 (two) times daily. Refills: 0     clopidogrel 75 MG Tabs  Commonly known as: Plavix      Take 1 tablet (75 mg total) by mouth daily. Refills: 0     flunisolide 25 MCG/ACT (0.025%) Soln      by Nasal route. 2 sprays each nostril twice a day   Refills: 0     gabapentin 300 MG Caps  Commonly known as: Neurontin      Take 1 capsule (300 mg total) by mouth nightly. Refills: 0     HYDROcodone-acetaminophen 5-325 MG Tabs  Commonly known as: Norco      Take 1 tablet by mouth every 6 (six) hours as needed for Pain. Refills: 0     lisinopril-hydroCHLOROthiazide 20-25 MG Tabs  Commonly known as: Zestoretic      1 tab daily   Refills: 0     metoprolol tartrate 25 MG Tabs  Commonly known as: Lopressor      Take 0.5 tablets (12.5 mg total) by mouth 2 (two) times daily. Half tab bid   Refills: 0               Where to Get Your Medications        These medications were sent to [address], [phone], [phone]  [facility] [address], [place] [id number]      Phone: [phone]   albuterol (2.5 MG/3ML) 0.083% Nebu  cefuroxime 500 MG Tabs  levoFLOXacin 750 MG Tabs         [place] Follow-up appointment:   [facility]    Follow up  As needed    Sharon Sterling MD  [address] [place] [id number]    Follow up in 1 week(s)      Archana Bartlett MD  [address] [facility] [address] [place] [address]  [phone]    Follow up in 1 week(s)  For Hospitalization follow-up    Appointments for Next 30 Days 9/18/2023 - 10/18/2023        Date Arrival Time Visit Type Length Department Provider     9/25/2023 11:30 AM  [facility]    Patient Instructions:         Location Instructions:     Masks are optional for all patients and visitors, unless otherwise indicated. Vital signs:       Physical Exam:    General: No acute distress   Lungs: clear to auscultation  Cardiovascular: S1, S2  Abdomen: Soft      -----------------------------------------------------------------------------------------------  PATIENT DISCHARGE INSTRUCTIONS: See electronic chart    Myrle Mohs, DO    Total time spent on discharge plannin minutes     The [facility] makes medical notes like these available to patients in the interest of transparency. Please be advised this is a medical document. Medical documents are intended to carry relevant information, facts as evident, and the clinical opinion of the practitioner. The medical note is intended as peer to peer communication and may appear blunt or direct. It is written in medical language and may contain abbreviations or verbiage that are unfamiliar.

## 2023-09-25 ENCOUNTER — TELEPHONE (OUTPATIENT)
Dept: INTERNAL MEDICINE CLINIC | Facility: CLINIC | Age: 73
End: 2023-09-25

## 2023-11-14 ENCOUNTER — HOSPITAL ENCOUNTER (OUTPATIENT)
Dept: CT IMAGING | Facility: HOSPITAL | Age: 73
Discharge: HOME | End: 2023-11-14
Payer: COMMERCIAL

## 2023-11-14 DIAGNOSIS — J12.82 PNEUMONIA DUE TO 2019-NCOV: ICD-10-CM

## 2023-11-14 DIAGNOSIS — U07.1 PNEUMONIA DUE TO 2019-NCOV: ICD-10-CM

## 2023-11-14 DIAGNOSIS — R93.89 ABNORMAL CT OF THE CHEST: ICD-10-CM

## 2023-11-14 PROCEDURE — 71250 CT THORAX DX C-: CPT

## 2024-02-26 ENCOUNTER — HOSPITAL ENCOUNTER (OUTPATIENT)
Age: 74
Discharge: HOME | End: 2024-02-26
Payer: COMMERCIAL

## 2024-02-26 VITALS
HEART RATE: 81 BPM | OXYGEN SATURATION: 95 % | RESPIRATION RATE: 22 BRPM | TEMPERATURE: 97 F | BODY MASS INDEX: 39 KG/M2 | SYSTOLIC BLOOD PRESSURE: 127 MMHG | WEIGHT: 230 LBS | DIASTOLIC BLOOD PRESSURE: 67 MMHG

## 2024-02-26 DIAGNOSIS — J10.1 INFLUENZA A: Primary | ICD-10-CM

## 2024-02-26 LAB
POCT INFLUENZA A: POSITIVE
POCT INFLUENZA B: NEGATIVE
S PYO AG THROAT QL IA.RAPID: NEGATIVE
SARS-COV-2 RNA RESP QL NAA+PROBE: NOT DETECTED

## 2024-02-26 PROCEDURE — 87651 STREP A DNA AMP PROBE: CPT | Performed by: NURSE PRACTITIONER

## 2024-02-26 PROCEDURE — 87502 INFLUENZA DNA AMP PROBE: CPT | Performed by: NURSE PRACTITIONER

## 2024-02-26 PROCEDURE — 99212 OFFICE O/P EST SF 10 MIN: CPT

## 2024-02-26 PROCEDURE — 99214 OFFICE O/P EST MOD 30 MIN: CPT

## 2024-02-26 NOTE — ED PROVIDER NOTES
Patient Seen in: [facility]      History     Chief Complaint   Patient presents with    Fever     Stated Complaint: Cough/URI, Headache, Weakness    Subjective:   73-year-old male presents to immediate care for cough, headache, fatigue and sore throat.  Patient states that he has had cough for the last 3 days last night he felt extreme fatigue he needed assistance getting up to his room.  Denies any nausea vomiting, denies any chest pain            Objective:   Past Medical History:   Diagnosis Date    Anxiety state     Back problem     COPD (chronic obstructive pulmonary disease) (HCC)     Diabetes (HCC)     High blood pressure     High cholesterol     Hyperlipidemia     PTSD (post-traumatic stress disorder)     Sleep apnea     Unspecified essential hypertension               Past Surgical History:   Procedure Laterality Date    OTHER SURGICAL HISTORY  2/5/2014    bone graft, mandible                Social History     Socioeconomic History    Marital status:    Tobacco Use    Smoking status: Former     Packs/day: 0.00     Years: 30.00     Additional pack years: 0.00     Total pack years: 0.00     Types: Cigarettes    Smokeless tobacco: Never   Substance and Sexual Activity    Alcohol use: Yes    Drug use: No     Social Determinants of Health     Financial Resource Strain: Low Risk  (9/15/2023)    Financial Resource Strain     Difficulty of Paying Living Expenses: Not hard at all     Med Affordability: No   Transportation Needs: No Transportation Needs (9/15/2023)    Transportation Needs     Lack of Transportation: No              Review of Systems   Constitutional:  Positive for fatigue and fever.   Respiratory: Negative.     Cardiovascular: Negative.    Gastrointestinal: Negative.    Skin: Negative.    Neurological: Negative.        Positive for stated complaint: Cough/URI, Headache, Weakness  Other systems are as noted in HPI.  Constitutional and vital signs reviewed.      All other  systems reviewed and negative except as noted above.    Physical Exam     ED Triage Vitals [02/26/24 1447]   /67   Pulse 81   Resp 22   Temp 96.8 °F (36 °C)   Temp src Temporal   SpO2 95 %   O2 Device None (Room air)       Current:/67   Pulse 81   Temp 96.8 °F (36 °C) (Temporal)   Resp 22   Wt 104.3 kg   SpO2 95%   BMI 39.48 kg/m²         Physical Exam  Vitals and nursing note reviewed.   Constitutional:       General: He is not in acute distress.  HENT:      Head: Normocephalic.      Nose: Congestion present.   Cardiovascular:      Rate and Rhythm: Normal rate.      Heart sounds: Normal heart sounds.   Pulmonary:      Effort: Pulmonary effort is normal.      Breath sounds: Normal breath sounds.   Musculoskeletal:         General: Normal range of motion.   Skin:     General: Skin is warm and dry.   Neurological:      General: No focal deficit present.      Mental Status: He is alert and oriented to person, place, and time.               ED Course     Labs Reviewed   POCT FLU TEST - Abnormal; Notable for the following components:       Result Value    POCT INFLUENZA A Positive (*)     All other components within normal limits    Narrative:     This assay is a rapid molecular in vitro test utilizing nucleic acid amplification of influenza A and B viral RNA.   RAPID STREP A - Normal   RAPID SARS-COV-2 BY PCR - Normal                      MDM      Medical Decision Making  Pertinent Labs & Imaging studies reviewed. (See chart for details).  Patient coming in with cough, fatigue, body aches.   Differential diagnosis includes influenza, COVID, strep  Labs reviewed influenza A is positive.  Will treat for influenza.  Will discharge on over-the-counter management fluid and rest. Patient is comfortable with this plan.     Overall Pt looks good. Non-toxic, well-hydrated and in no respiratory distress. Vital signs are reassuring. Exam is reassuring. I do not believe pt requires and additional diagnostic  studies or intervention. I believe pt can be discharged home to continue evaluation as an outpatient. Follow-up provider given. Discharge instructions given and reviewed. Return for any problems. All understand and agreewith the plan.     Please note that this report has been produced using speech recognition software and may contain errors related to that system including, but not limited to, errors in grammar, punctuation, and spelling, as well as words and phrases that possibly may have been recognized inappropriately. If there are any questions or concerns, contact the dictating provider for clarification.       Problems Addressed:  Influenza A: acute illness or injury    Amount and/or Complexity of Data Reviewed  Labs: ordered. Decision-making details documented in ED Course.    Risk  OTC drugs.        Disposition and Plan     Clinical Impression:  1. Influenza A         Disposition:  Discharge  2/26/2024  3:30 pm    Follow-up:  Jeffrey Mena MD  [address] [place] [id number]  [phone]                Medications Prescribed:  Discharge Medication List as of 2/26/2024  3:31 PM

## 2024-02-26 NOTE — DISCHARGE INSTRUCTIONS
I clear liquids, increased fluid intake   Fever control or pain, you may use ibuprofen, or acetaminophen as directed    Follow-up with your primary care physician for follow-up in three or 4 days if not improving    Return for respiratory distress, productive cough, vomiting, or any other changes in your condition    Rest

## 2025-06-20 ENCOUNTER — HOSPITAL ENCOUNTER (EMERGENCY)
Facility: HOSPITAL | Age: 75
Discharge: HOME | End: 2025-06-20
Attending: EMERGENCY MEDICINE
Payer: COMMERCIAL

## 2025-06-20 ENCOUNTER — APPOINTMENT (OUTPATIENT)
Dept: CT IMAGING | Facility: HOSPITAL | Age: 75
End: 2025-06-20
Payer: COMMERCIAL

## 2025-06-20 VITALS
HEIGHT: 65 IN | BODY MASS INDEX: 33.32 KG/M2 | SYSTOLIC BLOOD PRESSURE: 109 MMHG | OXYGEN SATURATION: 97 % | WEIGHT: 200 LBS | DIASTOLIC BLOOD PRESSURE: 68 MMHG | HEART RATE: 77 BPM | TEMPERATURE: 97 F | RESPIRATION RATE: 18 BRPM

## 2025-06-20 DIAGNOSIS — R41.3 AMNESIA: ICD-10-CM

## 2025-06-20 DIAGNOSIS — S09.8XXA BLUNT HEAD TRAUMA, INITIAL ENCOUNTER: Primary | ICD-10-CM

## 2025-06-20 DIAGNOSIS — S00.01XA: ICD-10-CM

## 2025-06-20 PROCEDURE — 99284 EMERGENCY DEPT VISIT MOD MDM: CPT

## 2025-06-20 PROCEDURE — 70450 CT HEAD/BRAIN W/O DYE: CPT | Performed by: EMERGENCY MEDICINE

## 2025-06-20 RX ORDER — CARBIDOPA AND LEVODOPA 25; 100 MG/1; MG/1
1 TABLET ORAL 3 TIMES DAILY
COMMUNITY

## 2025-06-20 NOTE — ED PROVIDER NOTES
Patient Seen in: [facility] Emergency Department      History     Stated Complaint: Fall, head laceration, no thinners  History of Present Illness  Leon Lovett is a 74 year old male who presents with a head injury after a fall. He is accompanied by his boss, La.    He fell while sitting on a walker and pushing himself backwards on the sidewalk, hitting his head. He experienced a brief period of memory loss following the fall. His son observed bleeding and noted he did not remember anything from the previous day. He has since regained his memory, recalling working on a snowblower the day before the fall. He denies dizziness or other injuries from the fall. He takes aspirin every couple of days and is not on any other blood thinners.    [Note: Patient (or parent) aware that ambient AI utilized for transcribing the HPI.]  Objective:   Past Medical History:    Anxiety state    Back problem    COPD (chronic obstructive pulmonary disease) (HCC)    Diabetes (HCC)    High blood pressure    High cholesterol    Hyperlipidemia    Pacemaker    PTSD (post-traumatic stress disorder)    Sleep apnea    Unspecified essential hypertension              Past Surgical History:   Procedure Laterality Date    Other surgical history  2/5/2014    bone graft, mandible                Social History     Socioeconomic History    Marital status:    Tobacco Use    Smoking status: Former     Current packs/day: 0.00     Types: Cigarettes    Smokeless tobacco: Never   Substance and Sexual Activity    Alcohol use: Yes    Drug use: No     Social Drivers of Health     Transportation Needs: No Transportation Needs (9/15/2023)    Transportation Needs     Lack of Transportation: No    [facility]    Housing Stability              Review of Systems    Positive for stated complaint: Fall, head laceration, no thinners  Other systems are as noted in HPI.  Constitutional and vital signs reviewed.       All other systems reviewed and negative except as noted above.    Physical Exam     ED Triage Vitals [06/20/25 1308]   /80   Pulse 98   Resp 17   Temp 97.2 °F (36.2 °C)   Temp src Temporal   SpO2 96 %   O2 Device None (Room air)       Current:/68   Pulse 77   Temp 97.2 °F (36.2 °C) (Temporal)   Resp 18   Ht 165.1 cm (5' 5\")   Wt 90.7 kg   SpO2 97%   BMI 33.28 kg/m²     Head: Occipital abrasion  Neck: Nontender  General:  Vitals as listed.  No acute distress   HEENT: Sclerae anicteric.  Conjunctivae show no pallor.  Oropharynx clear, mucous membranes moist   Lungs: good air exchange   Extremities: no edema, normal peripheral pulses   Neuro: Alert oriented and nonfocal      ED Course     Labs Reviewed - No data to display  CT BRAIN OR HEAD (CPT=70450)  Result Date: 6/20/2025  CONCLUSION:  Chronic changes in the brain, as described, but no acute intracranial bleed, or other acute intracranial process identified.  Ventriculomegaly is present.    LOCATION:  [facility]   Dictated by (CST): Rosas Espinosa MD on 6/20/2025 at 3:00 PM     Finalized by (CST): Rosas Espinosa MD on 6/20/2025 at 3:02 PM       I independently read the radiographs and no intracranial bleed on head CT scan    ED COURSE and MDM     Sources of the medical history included the patient and his wife who is an NP    Differential diagnosis before testing included blunt head trauma isolated to occipital abrasion versus intracranial bleed, a medical condition that poses a threat to life.    I reviewed prior external notes including evaluation by pulmonologist on 11/21/2023 for Pseudomonas pneumonia, covid, HAYDE, and COPD    To use Tylenol for discomfort    I have discussed with the patient the results of testing, differential diagnosis, and treatment plan. They expressed clear understanding of these instructions and agrees to the plan provided.    Disposition and Plan     Clinical Impression:  1. Blunt head trauma, initial encounter    2.  Abrasion of occiput, initial encounter    3. Amnesia         Disposition:  Discharge  6/20/2025  3:24 pm    Follow-up:  Ruben Martin DO  [address] [place] [id number] [phone]    Schedule an appointment as soon as possible for a visit in 3 day(s)  As needed        Medications Prescribed:  Current Discharge Medication List

## 2025-06-20 NOTE — ED INITIAL ASSESSMENT (HPI)
Pt states he fell backwards from his walker. Denies LOC. Abrasion noted to the back of the head, not actively bleeding. Pt having some issues with memory of events that happened yesterday.

## (undated) NOTE — LETTER
Date & Time: 9/12/2023, 2:51 PM  Patient: Genny Anand  Encounter Provider(s):    Kendal Mcdowell MD         This certifies that Dewayne Mohs, a patient at an [facility], am leaving the facility voluntarily and against the advice of my physician. I acknowledge that I have been:    1. informed that my physician believes that I need to receive care here;  2. informed that if I leave, I could become sicker or even die; and  3. provided discharge instructions consistent with my current diagnosis. I hereby release my physician, the facility, and its employees from all responsibility for any ill effects which may result from this action. __________________________________  Patient or authorized caregiver signature    __________________________________  RN signature    If no patient or patient representative signature was obtained, sign below to acknowledge that the form was reviewed with the patient and that the patient refused to sign.     __________________________________  RN signature

## (undated) NOTE — ED AVS SNAPSHOT
[facility] in [address]    [address]    Phone:  [phone]    Fax:  [address]   MRN: [id number]    Department:  [facility] [address] [place] [address],[address]   Date of Visit:  3/4/2017 - Albuterol Sulfate  (90 Base) MCG/ACT Aers  - cefdinir 300 MG Caps            Discharge References/Attachments     BRONCHITIS WITH WHEEZING (ADULT) (ENGLISH)      Disclosure     Insurance plans vary and the physician(s) referred by the [facility]. Follow-up care is at the discretion of that Physician. IF THERE IS ANY CHANGE OR WORSENING OF YOUR CONDITION, CALL YOUR PRIMARY CARE PHYSICIAN AT ONCE OR GO TO THE EMERGENCY DEPARTMENT.     If you have been prescribed any medication(s), - If you don’t have insurance, [facility] has partnered with [facility] to help you get signed up for insurance coverage.   [facility] is a Federal Navigator program that can help with your Affordable Care Act cover